# Patient Record
Sex: FEMALE | Race: WHITE | Employment: OTHER | URBAN - METROPOLITAN AREA
[De-identification: names, ages, dates, MRNs, and addresses within clinical notes are randomized per-mention and may not be internally consistent; named-entity substitution may affect disease eponyms.]

---

## 2018-03-29 ENCOUNTER — HOSPITAL ENCOUNTER (OUTPATIENT)
Age: 58
Discharge: HOME | End: 2018-03-31
Payer: COMMERCIAL

## 2018-03-29 ENCOUNTER — HOSPITAL ENCOUNTER (OUTPATIENT)
Dept: GENERAL RADIOLOGY | Age: 58
Discharge: HOME | End: 2018-03-31
Payer: COMMERCIAL

## 2018-03-29 DIAGNOSIS — M25.562 LEFT KNEE PAIN, UNSPECIFIED CHRONICITY: ICD-10-CM

## 2018-03-29 PROCEDURE — 73562 X-RAY EXAM OF KNEE 3: CPT

## 2021-03-03 ENCOUNTER — IMMUNIZATION (OUTPATIENT)
Dept: PRIMARY CARE CLINIC | Age: 61
End: 2021-03-03
Payer: COMMERCIAL

## 2021-03-03 PROCEDURE — 91300 COVID-19, PFIZER VACCINE 30MCG/0.3ML DOSE: CPT | Performed by: NURSE PRACTITIONER

## 2021-03-03 PROCEDURE — 0001A COVID-19, PFIZER VACCINE 30MCG/0.3ML DOSE: CPT | Performed by: NURSE PRACTITIONER

## 2021-03-29 ENCOUNTER — IMMUNIZATION (OUTPATIENT)
Dept: PRIMARY CARE CLINIC | Age: 61
End: 2021-03-29
Payer: COMMERCIAL

## 2021-03-29 PROCEDURE — 0002A COVID-19, PFIZER VACCINE 30MCG/0.3ML DOSE: CPT | Performed by: NURSE PRACTITIONER

## 2021-03-29 PROCEDURE — 91300 COVID-19, PFIZER VACCINE 30MCG/0.3ML DOSE: CPT | Performed by: NURSE PRACTITIONER

## 2022-11-12 ENCOUNTER — APPOINTMENT (OUTPATIENT)
Dept: GENERAL RADIOLOGY | Age: 62
End: 2022-11-12
Payer: COMMERCIAL

## 2022-11-12 ENCOUNTER — HOSPITAL ENCOUNTER (EMERGENCY)
Age: 62
Discharge: HOME | End: 2022-11-12
Payer: COMMERCIAL

## 2022-11-12 VITALS
HEART RATE: 92 BPM | BODY MASS INDEX: 35 KG/M2 | WEIGHT: 205 LBS | SYSTOLIC BLOOD PRESSURE: 153 MMHG | RESPIRATION RATE: 14 BRPM | OXYGEN SATURATION: 100 % | DIASTOLIC BLOOD PRESSURE: 65 MMHG | TEMPERATURE: 97.7 F | HEIGHT: 64 IN

## 2022-11-12 DIAGNOSIS — S82.402A CLOSED FRACTURE OF LEFT TIBIA AND FIBULA, INITIAL ENCOUNTER: Primary | ICD-10-CM

## 2022-11-12 DIAGNOSIS — S82.202A CLOSED FRACTURE OF LEFT TIBIA AND FIBULA, INITIAL ENCOUNTER: Primary | ICD-10-CM

## 2022-11-12 PROCEDURE — 99283 EMERGENCY DEPT VISIT LOW MDM: CPT

## 2022-11-12 PROCEDURE — 6370000000 HC RX 637 (ALT 250 FOR IP): Performed by: NURSE PRACTITIONER

## 2022-11-12 PROCEDURE — 73630 X-RAY EXAM OF FOOT: CPT

## 2022-11-12 PROCEDURE — 73610 X-RAY EXAM OF ANKLE: CPT

## 2022-11-12 RX ORDER — HYDROCODONE BITARTRATE AND ACETAMINOPHEN 5; 325 MG/1; MG/1
1 TABLET ORAL ONCE
Status: COMPLETED | OUTPATIENT
Start: 2022-11-12 | End: 2022-11-12

## 2022-11-12 RX ORDER — HYDROCODONE BITARTRATE AND ACETAMINOPHEN 5; 325 MG/1; MG/1
1 TABLET ORAL EVERY 8 HOURS PRN
Qty: 9 TABLET | Refills: 0 | Status: SHIPPED | OUTPATIENT
Start: 2022-11-12 | End: 2022-11-15

## 2022-11-12 RX ADMIN — HYDROCODONE BITARTRATE AND ACETAMINOPHEN 1 TABLET: 5; 325 TABLET ORAL at 14:42

## 2022-11-12 ASSESSMENT — PAIN DESCRIPTION - ORIENTATION: ORIENTATION: LEFT

## 2022-11-12 ASSESSMENT — PAIN DESCRIPTION - LOCATION: LOCATION: ANKLE

## 2022-11-12 ASSESSMENT — PAIN SCALES - GENERAL
PAINLEVEL_OUTOF10: 10
PAINLEVEL_OUTOF10: 8

## 2022-11-12 ASSESSMENT — PAIN - FUNCTIONAL ASSESSMENT: PAIN_FUNCTIONAL_ASSESSMENT: 0-10

## 2022-11-12 ASSESSMENT — PAIN DESCRIPTION - DESCRIPTORS: DESCRIPTORS: DISCOMFORT;SORE;SHARP

## 2022-11-12 NOTE — DISCHARGE INSTRUCTIONS
Return the ER for any worsening symptoms  Nonweightbearing  Follow-up with orthopedics  Do not drive at discharge receiving sedating medications in the ER  Do not drive, drink alcohol take other same occasions when taking Norco due to increased risk of sedation

## 2022-11-12 NOTE — ED PROVIDER NOTES
[facility]  Department of Emergency Medicine   ED  Encounter Note  Admit Date/RoomTime: 2022  2:13 PM  ED Room: /    NAME: Brigid Ch  : 1960  MRN: [id number]     Chief Complaint:  Ankle Pain Linda Vizcarra on step last night. Pain left ankle. Denies hitting head. No LOC. No neck pain.  ) and Fall    History of Present Illness         Kavin Hoffman is a 58 y.o. old female presenting to the emergency department by private vehicle, for traumatic Left ankle pain which occured 1 day(s) prior to arrival.  The complaint is due to tripped on the last step and rolled ankle. Since onset the symptoms have been remaining constant with ability to bear weight, but with some pain. Patient has no prior history of pain/injury with regards to today's visit. Her pain is aggraveated by any movement, any use of, or pressure on or palpation of painful area and relieved by nothing, as no treatment has been provided prior to this visit. She denies any head injury, headache, loss of consciousness, confusion, dizziness, neck pain, chest pain, abdominal pain, back pain, numbness, weakness, blurred vision, nausea, vomiting, fever, chills, wounds, or rash. ROS   Pertinent positives and negatives are stated within HPI, all other systems reviewed and are negative. Past Medical History:  has no past medical history on file. Surgical History:  has a past surgical history that includes knee surgery and Tubal ligation. Social History:  reports that she has been smoking cigarettes. She has been smoking an average of .25 packs per day. She does not have any smokeless tobacco history on file. She reports current alcohol use. Family History: family history is not on file.      Allergies: Shellfish-derived products and Penicillins    Physical Exam   Oxygen Saturation Interpretation: Normal.        ED Triage Vitals   BP Temp Temp Source Heart Rate Resp SpO2 Height Weight of fracture. XR FOOT LEFT (MIN 3 VIEWS)   Final Result   Acute minimally displaced obliquely oriented fracture involving the left   distal fibula and lateral malleolus region with fracture line extending to   the syndesmosis with there is no significant widening or asymmetry of the   ankle mortise however questionable laxity or instability associated with this   configuration of fracture. ED Course / Medical Decision Making     Medications   HYDROcodone-acetaminophen (NORCO) 5-325 MG per tablet 1 tablet (1 tablet Oral Given 11/12/22 1442)      [id number] post splint application. No neurovascular compromise noted. Consults:   None    Procedure(s):  None    MDM:      Imaging was obtained based on high suspicion for fracture / bony abnormality, dislocation, joint effusion as per history/physical findings. X-ray of the left foot and ankle reveals acute minimally displaced oriented fracture involving the left distal fibula and the lateral malleolus region extending into the stenosis there is no significant widening or asymmetry of the ankle mortise. Reviewed this imaging with ER attending, Dr. Garlin Cranker and agrees with the posterior splint with stirrups. No reduction needed. Plan is subsequently for symptom control, ice, elevation, use of brace or splint, crutches, and no weight bearing with outpatient follow-up with on-call orthopaedist as instructed in d/c instructions. [place] was reviewed and no signs of drug abuse was noted. Patient discharged home with Norco.  Discussed appropriate use of mental side effects of starting this medication including sedation addiction. Instructed her not to drive at discharge receiving sedating medications in the ER. She feels comfortable with crutches and has used them.     Plan of Care/Counseling:  Geralyn Jeans, APRN - CNP reviewed today's visit with the patient in addition to providing specific details for the plan of care and counseling regarding the diagnosis and prognosis. Questions are answered at this time and are agreeable with the plan. Assessment      1. Closed fracture of left tibia and fibula, initial encounter      Plan   Discharged home. Patient condition is stable    New Medications     New Prescriptions    HYDROCODONE-ACETAMINOPHEN (NORCO) 5-325 MG PER TABLET    Take 1 tablet by mouth every 8 hours as needed for Pain for up to 3 days. Intended supply: 3 days. Take lowest dose possible to manage pain     Electronically signed by RICARDO Vogt CNP   DD: 11/12/22  **This report was transcribed using voice recognition software. Every effort was made to ensure accuracy; however, inadvertent computerized transcription errors may be present.   END OF ED PROVIDER NOTE       RICARDO Vogt CNP  11/12/22 [id number]

## 2024-01-08 PROBLEM — R94.31 ABNORMAL EKG: Status: ACTIVE | Noted: 2024-01-08

## 2024-01-09 ENCOUNTER — OFFICE VISIT (OUTPATIENT)
Dept: CARDIOLOGY CLINIC | Age: 64
End: 2024-01-09
Payer: COMMERCIAL

## 2024-01-09 VITALS
RESPIRATION RATE: 19 BRPM | HEART RATE: 101 BPM | BODY MASS INDEX: 38.31 KG/M2 | SYSTOLIC BLOOD PRESSURE: 110 MMHG | WEIGHT: 216.2 LBS | HEIGHT: 63 IN | DIASTOLIC BLOOD PRESSURE: 66 MMHG

## 2024-01-09 DIAGNOSIS — I10 HYPERTENSION, UNSPECIFIED TYPE: ICD-10-CM

## 2024-01-09 DIAGNOSIS — E66.09 CLASS 2 OBESITY DUE TO EXCESS CALORIES WITH BODY MASS INDEX (BMI) OF 38.0 TO 38.9 IN ADULT, UNSPECIFIED WHETHER SERIOUS COMORBIDITY PRESENT: ICD-10-CM

## 2024-01-09 DIAGNOSIS — R94.31 ABNORMAL EKG: Primary | ICD-10-CM

## 2024-01-09 PROBLEM — E66.9 OBESITY: Status: ACTIVE | Noted: 2024-01-09

## 2024-01-09 PROCEDURE — 3078F DIAST BP <80 MM HG: CPT | Performed by: INTERNAL MEDICINE

## 2024-01-09 PROCEDURE — 1036F TOBACCO NON-USER: CPT | Performed by: INTERNAL MEDICINE

## 2024-01-09 PROCEDURE — G8417 CALC BMI ABV UP PARAM F/U: HCPCS | Performed by: INTERNAL MEDICINE

## 2024-01-09 PROCEDURE — G8484 FLU IMMUNIZE NO ADMIN: HCPCS | Performed by: INTERNAL MEDICINE

## 2024-01-09 PROCEDURE — 3017F COLORECTAL CA SCREEN DOC REV: CPT | Performed by: INTERNAL MEDICINE

## 2024-01-09 PROCEDURE — 3074F SYST BP LT 130 MM HG: CPT | Performed by: INTERNAL MEDICINE

## 2024-01-09 PROCEDURE — 99204 OFFICE O/P NEW MOD 45 MIN: CPT | Performed by: INTERNAL MEDICINE

## 2024-01-09 PROCEDURE — 93000 ELECTROCARDIOGRAM COMPLETE: CPT | Performed by: INTERNAL MEDICINE

## 2024-01-09 PROCEDURE — G8427 DOCREV CUR MEDS BY ELIG CLIN: HCPCS | Performed by: INTERNAL MEDICINE

## 2024-01-09 RX ORDER — FLUTICASONE FUROATE, UMECLIDINIUM BROMIDE AND VILANTEROL TRIFENATATE 200; 62.5; 25 UG/1; UG/1; UG/1
1 POWDER RESPIRATORY (INHALATION) DAILY
COMMUNITY

## 2024-01-09 RX ORDER — ESZOPICLONE 3 MG/1
3 TABLET, FILM COATED ORAL NIGHTLY PRN
COMMUNITY
Start: 2023-04-02

## 2024-01-09 RX ORDER — VALSARTAN AND HYDROCHLOROTHIAZIDE 320; 25 MG/1; MG/1
1 TABLET, FILM COATED ORAL EVERY MORNING
COMMUNITY
Start: 2023-12-18

## 2024-01-09 RX ORDER — CYCLOBENZAPRINE HCL 10 MG
TABLET ORAL
COMMUNITY

## 2024-01-09 RX ORDER — CETIRIZINE HYDROCHLORIDE 10 MG/1
10 TABLET ORAL DAILY
COMMUNITY

## 2024-01-09 NOTE — PROGRESS NOTES
Chief Complaint   Patient presents with    Abnormal Test Results       Patient Active Problem List    Diagnosis Date Noted    HTN (hypertension) 01/09/2024    Obesity 01/09/2024    Abnormal EKG 01/08/2024       Current Outpatient Medications   Medication Sig Dispense Refill    cyclobenzaprine (FLEXERIL) 10 MG tablet TAKE 1 TABLET BY MOUTH EVERY DAY AS NEEDED Oral for 90      eszopiclone (LUNESTA) 3 MG TABS Take 1 tablet by mouth nightly as needed.      cetirizine (ZYRTEC) 10 MG tablet Take 1 tablet by mouth daily      valsartan-hydroCHLOROthiazide (DIOVAN-HCT) 320-25 MG per tablet Take 1 tablet by mouth every morning      fluticasone-umeclidin-vilant (TRELEGY ELLIPTA) 200-62.5-25 MCG/ACT AEPB inhaler Inhale 1 puff into the lungs daily      buPROPion (WELLBUTRIN XL) 300 MG XL tablet Take 1 tablet by mouth every morning       No current facility-administered medications for this visit.        Allergies   Allergen Reactions    Shellfish-Derived Products     Penicillins        Vitals:    01/09/24 0853   BP: 110/66   Pulse: (!) 101   Resp: 19   Weight: 98.1 kg (216 lb 3.2 oz)   Height: 1.6 m (5' 3\")                 SUBJECTIVE: Hillary Griffin presents to the office today for consult - dr zuniga     She complains of  NO new or unstable cardiac symptoms  and denies   dyspnea, exertional chest pressure/discomfort, fatigue, irregular heart beat, lower extremity edema, near-syncope, orthopnea, palpitations, paroxysmal nocturnal dyspnea, syncope, and tachypnea  Does all AODLs, limited by knees  Remote smoker  Just diagnosed with HTN and palced on ACE/thiazide combo   Had EKG for completeness - I reviewed PCP tracing as well.          Physical Exam   /66   Pulse (!) 101   Resp 19   Ht 1.6 m (5' 3\")   Wt 98.1 kg (216 lb 3.2 oz)   BMI 38.30 kg/m²   Constitutional: Oriented to person, place, and time. Obese No distress.    Neck: No JVD present. Carotid bruit is not present.   Cardiovascular: Normal rate, regular